# Patient Record
Sex: MALE | Race: WHITE | URBAN - METROPOLITAN AREA
[De-identification: names, ages, dates, MRNs, and addresses within clinical notes are randomized per-mention and may not be internally consistent; named-entity substitution may affect disease eponyms.]

---

## 2017-03-28 DIAGNOSIS — G60.9 IDIOPATHIC PERIPHERAL NEUROPATHY: ICD-10-CM

## 2017-03-29 RX ORDER — PREGABALIN 300 MG/1
300 CAPSULE ORAL 2 TIMES DAILY
Qty: 60 CAPSULE | Refills: 2 | OUTPATIENT
Start: 2017-03-29 | End: 2017-05-17 | Stop reason: SDUPTHER

## 2017-05-17 ENCOUNTER — OFFICE VISIT (OUTPATIENT)
Dept: NEUROLOGY | Age: 62
End: 2017-05-17

## 2017-05-17 VITALS
HEIGHT: 73 IN | DIASTOLIC BLOOD PRESSURE: 82 MMHG | HEART RATE: 79 BPM | BODY MASS INDEX: 30.09 KG/M2 | SYSTOLIC BLOOD PRESSURE: 121 MMHG | OXYGEN SATURATION: 94 % | WEIGHT: 227 LBS

## 2017-05-17 DIAGNOSIS — R20.2 NUMBNESS AND TINGLING OF BOTH LEGS: ICD-10-CM

## 2017-05-17 DIAGNOSIS — G60.9 IDIOPATHIC PERIPHERAL NEUROPATHY: Primary | ICD-10-CM

## 2017-05-17 DIAGNOSIS — R20.0 NUMBNESS AND TINGLING OF BOTH LEGS: ICD-10-CM

## 2017-05-17 DIAGNOSIS — F10.20 ETOHISM (HCC): ICD-10-CM

## 2017-05-17 DIAGNOSIS — R27.0 ATAXIA: ICD-10-CM

## 2017-05-17 PROCEDURE — 99213 OFFICE O/P EST LOW 20 MIN: CPT | Performed by: PSYCHIATRY & NEUROLOGY

## 2017-05-17 RX ORDER — PREGABALIN 150 MG/1
300 CAPSULE ORAL 2 TIMES DAILY
Qty: 120 CAPSULE | Refills: 5 | Status: SHIPPED | OUTPATIENT
Start: 2017-05-17 | End: 2017-10-02 | Stop reason: SDUPTHER

## 2017-10-02 ENCOUNTER — TELEPHONE (OUTPATIENT)
Dept: NEUROLOGY | Age: 62
End: 2017-10-02

## 2017-10-02 DIAGNOSIS — R20.2 NUMBNESS AND TINGLING OF BOTH LEGS: ICD-10-CM

## 2017-10-02 DIAGNOSIS — G60.9 IDIOPATHIC PERIPHERAL NEUROPATHY: ICD-10-CM

## 2017-10-02 DIAGNOSIS — R20.0 NUMBNESS AND TINGLING OF BOTH LEGS: ICD-10-CM

## 2017-10-02 RX ORDER — PREGABALIN 150 MG/1
CAPSULE ORAL
Qty: 150 CAPSULE | Refills: 1 | Status: SHIPPED | OUTPATIENT
Start: 2017-10-02 | End: 2017-11-15 | Stop reason: SDUPTHER

## 2017-10-02 NOTE — TELEPHONE ENCOUNTER
Pt called stating he needs a refill on lyrica. Pt said he has been taking more medication than he is supposed too. Asked if it can increase. Pt uses [facility]. He will be out of town until Friday but we can leave a message and they can retreive it.  Please advise

## 2017-11-15 ENCOUNTER — OFFICE VISIT (OUTPATIENT)
Dept: NEUROLOGY | Age: 62
End: 2017-11-15

## 2017-11-15 VITALS
SYSTOLIC BLOOD PRESSURE: 128 MMHG | HEART RATE: 62 BPM | OXYGEN SATURATION: 92 % | HEIGHT: 73 IN | DIASTOLIC BLOOD PRESSURE: 77 MMHG | BODY MASS INDEX: 29.69 KG/M2 | WEIGHT: 224 LBS

## 2017-11-15 DIAGNOSIS — R20.0 NUMBNESS AND TINGLING OF BOTH LEGS: ICD-10-CM

## 2017-11-15 DIAGNOSIS — F10.20 ETOHISM (HCC): ICD-10-CM

## 2017-11-15 DIAGNOSIS — G60.9 IDIOPATHIC PERIPHERAL NEUROPATHY: Primary | ICD-10-CM

## 2017-11-15 DIAGNOSIS — R20.2 NUMBNESS AND TINGLING OF BOTH LEGS: ICD-10-CM

## 2017-11-15 PROCEDURE — 99213 OFFICE O/P EST LOW 20 MIN: CPT | Performed by: PSYCHIATRY & NEUROLOGY

## 2017-11-15 RX ORDER — PREGABALIN 150 MG/1
CAPSULE ORAL
Qty: 450 CAPSULE | Refills: 1 | Status: SHIPPED | OUTPATIENT
Start: 2017-11-15 | End: 2018-05-08 | Stop reason: SDUPTHER

## 2017-11-15 NOTE — PROGRESS NOTES
The patient came today for follow up regarding:  polyneuropathy. Since the patient's last visit, he increased his Lyrica to 300 mg in am and 450 mg at night. He denies any side effect from such medication. He continues to have the same daily  intermittent paresthesias in his feet. Symptoms are daily and can wax from mild to moderate. Symptoms can be worse with standing in his feet at his job and after a long workday. No recent falling or ataxia. No similar symptoms in his hands. Denies any weakness or significant back or neck pain. No recent fever or chills. Other review of system was unremarkable. Past Medical History:   Diagnosis Date    Gout     Hypertension      Prior to Visit Medications    Medication Sig Taking? Authorizing Provider   pregabalin (LYRICA) 150 MG capsule Take 2 caps PO Q morning and 3 caps PO QHS Yes Kelsey Kumar MD   budesonide-formoterol (SYMBICORT) 160-4.5 MCG/ACT AERO Inhale 2 puffs into the lungs 2 times daily Yes Marilee Farrar MD     No Known Allergies  Social History   Substance Use Topics    Smoking status: Former Smoker     Packs/day: 1.00     Years: 16.00     Quit date: 11/26/2010    Smokeless tobacco: Never Used    Alcohol use No     Family History   Problem Relation Age of Onset    Depression Father     Hearing Loss Father    Suman [place] Hearing Loss Mother     Substance Abuse Mother     Substance Abuse Father      Past Surgical History:   Procedure Laterality Date    ELBOW SURGERY      SHOULDER SURGERY      WRIST SURGERY           Exam:   Constitutional:   Vitals:    11/15/17 1208   BP: 128/77   Pulse: 62   SpO2: 92%   Weight: 224 lb (101.6 kg)   Height: 6' 1\" (1.854 m)       General appearance: well-nourished. Eye: No icterus. PRRR   Neck: supple  Cardiovascular: No carotid bruit. No lower leg edema  Mental Status: Oriented to person, place, problem, and time. Fluent speech. Aware of recent and remote event. Good fund of knowledge. Normal attention span and concentration. Cranial Nerves:   II: Visual fields: Full to confrontation  III: Pupils: equal, round, reactive to light  III,IV,VI: Extra Ocular Movements are intact. No nystagmus  V: Facial sensation is intact to pin prick and light touch  VII: Facial strength and movements: intact and symmetric smile,cheek puffing and eyebrow elevation  VIII: Hearing: Intact to finger rub bilaterally  IX: Palate elevation is symmetric  XI: Shoulder shrug is intact  XII: Tongue movements are normal  Musculoskeletal: 5/5 in all 4 extremities. Normal tone. Reflexes: Bilateral biceps 2/4, triceps 2/4, brachial radialis 2/4, knee 2/4 and ankle 2/4. Coordination: no pronator drift, no dysmetria. Finger nose finger testing within normal limits. Sensation: normal to all modalities in his arms. The same parasthenia in his feet. Gait/Posture: steady      ROS: the patient denies any chest pain, dysphagia or dysarthria, weakness, neck or back pain, severe depression, recent fever or chills or syncope, recent trauma, abdominal pain, diarrhea or constipation, joint pain, and other 14 points ROS reviewed with the patient which were normal except as mentioned in my HPI  No change      I personally reviewed social history, past medical history, medications, allergy, surgical history, and family history as documented in the patient's electronic health records. Labs and test results: reviewed and discussed with the patient. Assessment:  Chronic length dependent sensory polyneuropathy. 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 The same. Sensory ataxia   Hx of ETOH abuse     Plan:  Continue with Lyrica 150 mg cap. He takes two in am and three at night. 6 month refill.    SE was discussed  Fall precautions  MVI daily  RTC 6 months

## 2017-11-22 ENCOUNTER — TELEPHONE (OUTPATIENT)
Dept: NEUROLOGY | Age: 62
End: 2017-11-22

## 2018-01-23 ENCOUNTER — OFFICE VISIT (OUTPATIENT)
Dept: FAMILY MEDICINE CLINIC | Age: 63
End: 2018-01-23

## 2018-01-23 VITALS
HEART RATE: 76 BPM | TEMPERATURE: 98.6 F | WEIGHT: 223 LBS | HEIGHT: 73 IN | BODY MASS INDEX: 29.55 KG/M2 | SYSTOLIC BLOOD PRESSURE: 118 MMHG | DIASTOLIC BLOOD PRESSURE: 84 MMHG | OXYGEN SATURATION: 96 %

## 2018-01-23 DIAGNOSIS — J18.9 PNEUMONIA DUE TO INFECTIOUS ORGANISM, UNSPECIFIED LATERALITY, UNSPECIFIED PART OF LUNG: Primary | ICD-10-CM

## 2018-01-23 DIAGNOSIS — J44.1 CHRONIC OBSTRUCTIVE PULMONARY DISEASE WITH ACUTE EXACERBATION (HCC): ICD-10-CM

## 2018-01-23 PROCEDURE — 99214 OFFICE O/P EST MOD 30 MIN: CPT | Performed by: NURSE PRACTITIONER

## 2018-01-23 RX ORDER — METHYLPREDNISOLONE 4 MG/1
TABLET ORAL
Qty: 1 KIT | Refills: 0 | Status: SHIPPED | OUTPATIENT
Start: 2018-01-23 | End: 2018-01-29

## 2018-01-23 RX ORDER — ALBUTEROL SULFATE 90 UG/1
2 AEROSOL, METERED RESPIRATORY (INHALATION) EVERY 6 HOURS PRN
Qty: 1 INHALER | Refills: 5 | Status: SHIPPED | OUTPATIENT
Start: 2018-01-23

## 2018-01-23 RX ORDER — DOXYCYCLINE HYCLATE 100 MG/1
100 CAPSULE ORAL 2 TIMES DAILY
Qty: 10 CAPSULE | Refills: 0 | Status: SHIPPED | OUTPATIENT
Start: 2018-01-23 | End: 2018-01-30

## 2018-01-23 RX ORDER — BUDESONIDE AND FORMOTEROL FUMARATE DIHYDRATE 160; 4.5 UG/1; UG/1
2 AEROSOL RESPIRATORY (INHALATION) 2 TIMES DAILY
Qty: 1 INHALER | Refills: 5 | Status: SHIPPED | OUTPATIENT
Start: 2018-01-23

## 2018-01-23 ASSESSMENT — ENCOUNTER SYMPTOMS
COUGH: 1
WHEEZING: 1
ALLERGIC/IMMUNOLOGIC NEGATIVE: 1
EYES NEGATIVE: 1
RHINORRHEA: 1
GASTROINTESTINAL NEGATIVE: 1
SINUS PRESSURE: 1

## 2018-01-23 NOTE — LETTER
[place]  [facility] [address] [place] [id number]  Phone: [phone]  Fax: [phone]    Tung Rivera CNP        January 23, 2018     Patient: Manny Martínez   YOB: 1955   Date of Visit: 1/23/2018       To Whom It May Concern: It is my medical opinion that Janes Ling may return to work on 1/25/2018. If you have any questions or concerns, please don't hesitate to call.     Sincerely,          Tung Rivera CNP

## 2018-01-23 NOTE — PROGRESS NOTES
[facility]  [facility]  [place] [address]  Dept: [phone]  Dept Fax: [phone]    Jules Argueta is a 57 y.o. male who presents today for his medical conditions/complaints as noted below. Jules Argueta is c/o of Cough (started about 3 wks ago, states it is getting worse, and now it hurts to cough and at night he hears gurgling when he lays down)    Chief Complaint   Patient presents with    Cough     started about 3 wks ago, states it is getting worse, and now it hurts to cough and at night he hears gurgling when he lays down     HPI:     Cough   This is a recurrent problem. The current episode started 1 to 4 weeks ago. The problem has been gradually worsening. The problem occurs constantly. The cough is productive of sputum. Associated symptoms include chest pain (Pleuritic), chills, a fever, nasal congestion, postnasal drip, rhinorrhea, sweats and wheezing. Associated symptoms comments: Gurgling when recumbent   . The symptoms are aggravated by lying down. He has tried a beta-agonist inhaler and steroid inhaler (Nebulizer, symbicort) for the symptoms. The treatment provided mild relief. His past medical history is significant for COPD. Subjective:      Review of Systems   Constitutional: Positive for chills and fever. Negative for activity change and appetite change. HENT: Positive for congestion, postnasal drip, rhinorrhea and sinus pressure. Eyes: Negative. Respiratory: Positive for cough and wheezing. Cardiovascular: Positive for chest pain (Pleuritic). Gastrointestinal: Negative. Endocrine: Negative. Genitourinary: Negative. Musculoskeletal: Negative. Skin: Negative. Allergic/Immunologic: Negative. Neurological: Negative for dizziness and light-headedness. Hematological: Negative.       Objective:     Vitals:    01/23/18 1111   BP: 118/84   Pulse: 76   Temp: 98.6 °F (37 °C)   TempSrc: Oral   SpO2: 96% borderline positive MARIELLE test at a titer of  1:160 and a homogeneous pattern. This pattern can be seen with  autoantibodies to native DNA, DNP, or histone and may be  associated with systemic lupus erythematosis (SLE) or other  rheumatic diseases. In view of the low titer of MARIELLE, the  likelihood of SLE or other rheumatic diseases is low. In  addition to autoimmune diseases, ANAs are also associated with  other conditions (see table). Furthermore, up to 5% of healthy  individuals, especially in older populations, may have an MARIELLE  titer of 1:160. DISEASE  % OF POS RESULTS     DISEASE        % OF POS RESULTS    SLE. ...............      Rheumatoid Arthritis. ...... 30-50  Scleroderma. ........ 60-80     Multiple sclerosis. ........... 25  Sjogren syndrome. Patricia Riedel Jammie Riedel Jammie Riedel 40-70     ITP. ....................... 10-30  Dermatomyositis. .... 30-80     Thyroid disease. ........... 30-50  Polymyositis. ....... 30-80     Discoid lupus. .............. 5-25  Raynaud phenomenon. .20-60     Fibromyalgia. .. ............ 15-25  Drug-induced SLE. ....~100     Autoimmune hepatic disease. .~100  MCTD. ................ ~100  Juvenile Rheumatoid arthritis with uveitis. .............. 20-50  Infectious diseases. ............................. varies widely  Malignancies. .................................... varies widely    Although in some cases there may be clinical correlation with  particular staining patterns, the relevance of these  associations have been largely supplanted by the assay for  antibodies to specific nuclear antigens. This assay is more disease-specific  than the patterns of immunofluorescent  staining and may also provide clinically useful prognostic  information. Further testing for specific autoantibodies is driven by the  clinical findings/impressions.  If clinical suspicion of SLE  or other rheumatic disease is high, consider testing for the  following:    -anti-dsDNA (anti-double stranded DNA) and anti-Sm   (anti-Smith)

## 2018-05-08 ENCOUNTER — OFFICE VISIT (OUTPATIENT)
Dept: NEUROLOGY | Age: 63
End: 2018-05-08

## 2018-05-08 ENCOUNTER — HOSPITAL ENCOUNTER (OUTPATIENT)
Dept: OTHER | Age: 63
Discharge: HOME | End: 2018-05-08
Attending: PSYCHIATRY & NEUROLOGY | Admitting: PSYCHIATRY & NEUROLOGY

## 2018-05-08 VITALS
OXYGEN SATURATION: 96 % | SYSTOLIC BLOOD PRESSURE: 126 MMHG | WEIGHT: 224 LBS | DIASTOLIC BLOOD PRESSURE: 78 MMHG | HEART RATE: 70 BPM | BODY MASS INDEX: 29.69 KG/M2 | HEIGHT: 73 IN

## 2018-05-08 DIAGNOSIS — R20.2 NUMBNESS AND TINGLING OF BOTH LEGS: ICD-10-CM

## 2018-05-08 DIAGNOSIS — R20.0 NUMBNESS AND TINGLING OF BOTH LEGS: ICD-10-CM

## 2018-05-08 DIAGNOSIS — G60.9 IDIOPATHIC PERIPHERAL NEUROPATHY: Primary | ICD-10-CM

## 2018-05-08 DIAGNOSIS — R27.0 ATAXIA: ICD-10-CM

## 2018-05-08 LAB
A/G RATIO: 1.2 (ref 1.1–2.2)
ALBUMIN SERPL-MCNC: 4.2 G/DL (ref 3.4–5)
ALP BLD-CCNC: 97 U/L (ref 40–129)
ALT SERPL-CCNC: 26 U/L (ref 10–40)
ANION GAP SERPL CALCULATED.3IONS-SCNC: 11 MMOL/L (ref 3–16)
AST SERPL-CCNC: 28 U/L (ref 15–37)
BASOPHILS ABSOLUTE: 0 K/UL (ref 0–0.2)
BASOPHILS RELATIVE PERCENT: 0.7 %
BILIRUB SERPL-MCNC: 0.4 MG/DL (ref 0–1)
BILIRUBIN DIRECT: <0.2 MG/DL (ref 0–0.3)
BILIRUBIN, INDIRECT: NORMAL MG/DL (ref 0–1)
BUN BLDV-MCNC: 23 MG/DL (ref 7–20)
CALCIUM SERPL-MCNC: 9.4 MG/DL (ref 8.3–10.6)
CHLORIDE BLD-SCNC: 104 MMOL/L (ref 99–110)
CO2: 27 MMOL/L (ref 21–32)
CREAT SERPL-MCNC: 1.1 MG/DL (ref 0.8–1.3)
EOSINOPHILS ABSOLUTE: 0.1 K/UL (ref 0–0.6)
EOSINOPHILS RELATIVE PERCENT: 1.1 %
GFR AFRICAN AMERICAN: >60
GFR NON-AFRICAN AMERICAN: >60
GLOBULIN: 3.4 G/DL
GLUCOSE BLD-MCNC: 99 MG/DL (ref 70–99)
HCT VFR BLD CALC: 40.8 % (ref 40.5–52.5)
HEMOGLOBIN: 13.9 G/DL (ref 13.5–17.5)
LYMPHOCYTES ABSOLUTE: 1.5 K/UL (ref 1–5.1)
LYMPHOCYTES RELATIVE PERCENT: 27.3 %
MCH RBC QN AUTO: 30.1 PG (ref 26–34)
MCHC RBC AUTO-ENTMCNC: 34.1 G/DL (ref 31–36)
MCV RBC AUTO: 88.3 FL (ref 80–100)
MONOCYTES ABSOLUTE: 0.4 K/UL (ref 0–1.3)
MONOCYTES RELATIVE PERCENT: 7.7 %
NEUTROPHILS ABSOLUTE: 3.4 K/UL (ref 1.7–7.7)
NEUTROPHILS RELATIVE PERCENT: 63.2 %
PDW BLD-RTO: 13.2 % (ref 12.4–15.4)
PLATELET # BLD: 195 K/UL (ref 135–450)
PMV BLD AUTO: 7.8 FL (ref 5–10.5)
POTASSIUM SERPL-SCNC: 4.1 MMOL/L (ref 3.5–5.1)
RBC # BLD: 4.62 M/UL (ref 4.2–5.9)
SODIUM BLD-SCNC: 142 MMOL/L (ref 136–145)
TOTAL PROTEIN: 7.6 G/DL (ref 6.4–8.2)
WBC # BLD: 5.4 K/UL (ref 4–11)

## 2018-05-08 PROCEDURE — 99213 OFFICE O/P EST LOW 20 MIN: CPT | Performed by: PSYCHIATRY & NEUROLOGY

## 2018-05-08 RX ORDER — PREGABALIN 150 MG/1
CAPSULE ORAL
Qty: 450 CAPSULE | Refills: 1 | Status: SHIPPED | OUTPATIENT
Start: 2018-05-08 | End: 2018-11-08

## 2018-07-31 ENCOUNTER — TELEPHONE (OUTPATIENT)
Dept: NEUROLOGY | Age: 63
End: 2018-07-31

## 2018-07-31 NOTE — TELEPHONE ENCOUNTER
[id number] called asking for PA for Lyrica. I advised her that we had already submitted a pa in May, but that I would try again. Sent PA request thru [phone].

## 2023-11-02 NOTE — PATIENT INSTRUCTIONS
Detail Level: Detailed
You inhale most bronchodilators, so they start to act quickly. Always carry your quick-relief inhaler with you in case you need it while you are away from home. ¨ Corticosteroids (prednisone, budesonide). These reduce airway inflammation. They come in pill or inhaled form. You must take these medicines every day for them to work well. ? · A spacer may help you get more inhaled medicine to your lungs. Ask your doctor or pharmacist if a spacer is right for you. If it is, ask how to use it properly. ? · Do not take any vitamins, over-the-counter medicine, or herbal products without talking to your doctor first.   ? · If your doctor prescribed antibiotics, take them as directed. Do not stop taking them just because you feel better. You need to take the full course of antibiotics. ? · Oxygen therapy boosts the amount of oxygen in your blood and helps you breathe easier. Use the flow rate your doctor has recommended, and do not change it without talking to your doctor first.   Activity  ? · Get regular exercise. Walking is an easy way to get exercise. Start out slowly, and walk a little more each day. ? · Pay attention to your breathing. You are exercising too hard if you cannot talk while you are exercising. ? · Take short rest breaks when doing household chores and other activities. ? · Learn breathing methods-such as breathing through pursed lips-to help you become less short of breath. ? · If your doctor has not set you up with a pulmonary rehabilitation program, talk to him or her about whether rehab is right for you. Rehab includes exercise programs, education about your disease and how to manage it, help with diet and other changes, and emotional support. Diet  ? · Eat regular, healthy meals. Use bronchodilators about 1 hour before you eat to make it easier to eat. Eat several small meals instead of three large ones. Drink beverages at the end of the meal. Avoid foods that are hard to chew.    ?